# Patient Record
Sex: FEMALE | Race: WHITE | URBAN - METROPOLITAN AREA
[De-identification: names, ages, dates, MRNs, and addresses within clinical notes are randomized per-mention and may not be internally consistent; named-entity substitution may affect disease eponyms.]

---

## 2018-06-04 ENCOUNTER — IMPORTED ENCOUNTER (OUTPATIENT)
Dept: URBAN - METROPOLITAN AREA CLINIC 50 | Facility: CLINIC | Age: 78
End: 2018-06-04

## 2018-06-25 ENCOUNTER — IMPORTED ENCOUNTER (OUTPATIENT)
Dept: URBAN - METROPOLITAN AREA CLINIC 50 | Facility: CLINIC | Age: 78
End: 2018-06-25

## 2018-12-31 ENCOUNTER — IMPORTED ENCOUNTER (OUTPATIENT)
Dept: URBAN - METROPOLITAN AREA CLINIC 50 | Facility: CLINIC | Age: 78
End: 2018-12-31

## 2019-05-06 ENCOUNTER — IMPORTED ENCOUNTER (OUTPATIENT)
Dept: URBAN - METROPOLITAN AREA CLINIC 50 | Facility: CLINIC | Age: 79
End: 2019-05-06

## 2019-05-06 NOTE — PATIENT DISCUSSION
"""Start Acyclovir 800 mg by mouth twice a day ."" ""Start Prednisolone Acetate right eye four times a day . """

## 2019-05-20 ENCOUNTER — IMPORTED ENCOUNTER (OUTPATIENT)
Dept: URBAN - METROPOLITAN AREA CLINIC 50 | Facility: CLINIC | Age: 79
End: 2019-05-20

## 2019-06-17 ENCOUNTER — IMPORTED ENCOUNTER (OUTPATIENT)
Dept: URBAN - METROPOLITAN AREA CLINIC 50 | Facility: CLINIC | Age: 79
End: 2019-06-17

## 2019-06-24 ENCOUNTER — IMPORTED ENCOUNTER (OUTPATIENT)
Dept: URBAN - METROPOLITAN AREA CLINIC 50 | Facility: CLINIC | Age: 79
End: 2019-06-24

## 2019-12-04 ENCOUNTER — IMPORTED ENCOUNTER (OUTPATIENT)
Dept: URBAN - METROPOLITAN AREA CLINIC 50 | Facility: CLINIC | Age: 79
End: 2019-12-04

## 2019-12-09 ENCOUNTER — IMPORTED ENCOUNTER (OUTPATIENT)
Dept: URBAN - METROPOLITAN AREA CLINIC 50 | Facility: CLINIC | Age: 79
End: 2019-12-09

## 2020-05-12 ENCOUNTER — IMPORTED ENCOUNTER (OUTPATIENT)
Dept: URBAN - METROPOLITAN AREA CLINIC 50 | Facility: CLINIC | Age: 80
End: 2020-05-12

## 2020-06-29 ENCOUNTER — IMPORTED ENCOUNTER (OUTPATIENT)
Dept: URBAN - METROPOLITAN AREA CLINIC 50 | Facility: CLINIC | Age: 80
End: 2020-06-29

## 2020-12-21 ENCOUNTER — IMPORTED ENCOUNTER (OUTPATIENT)
Dept: URBAN - METROPOLITAN AREA CLINIC 50 | Facility: CLINIC | Age: 80
End: 2020-12-21

## 2020-12-21 NOTE — PATIENT DISCUSSION
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

## 2021-03-12 ENCOUNTER — IMPORTED ENCOUNTER (OUTPATIENT)
Dept: URBAN - METROPOLITAN AREA CLINIC 50 | Facility: CLINIC | Age: 81
End: 2021-03-12

## 2021-04-17 ASSESSMENT — VISUAL ACUITY
OS_CC: 20/25
OD_CC: 20/30
OS_CC: 20/30-1
OS_CC: 20/30
OD_CC: 20/25-2
OS_CC: 20/40-2
OS_CC: 20/25-1
OS_CC: J1+
OS_CC: J1+
OS_CC: 20/25
OS_CC: J2@ 13 IN
OD_CC: J1+
OS_CC: J2@ 15 IN
OD_CC: J1+
OS_OTHER: 20/70. 20/200.
OS_CC: 20/25-2
OD_CC: 20/25+1
OS_CC: 20/30
OS_CC: 20/30-1
OS_CC: 20/30
OD_CC: 20/20
OD_CC: 20/30-1
OS_CC: 20/30+2
OD_CC: 20/20
OD_CC: 20/20
OS_BAT: 20/70
OD_CC: J1+
OD_CC: J1+@ 15 IN
OD_CC: 20/25-2
OS_CC: J1+@ 15 IN
OD_OTHER: 20/30. 20/70.
OD_CC: J2@ 15 IN
OS_CC: J1+
OD_CC: 20/30
OD_CC: J1+
OD_CC: 20/20
OD_BAT: 20/30
OS_PH: 20/40
OS_CC: J1+
OD_CC: 20/20-2
OD_CC: J2@ 13 IN

## 2021-04-17 ASSESSMENT — TONOMETRY
OS_IOP_MMHG: 14
OD_IOP_MMHG: 14
OD_IOP_MMHG: 13
OD_IOP_MMHG: 14
OD_IOP_MMHG: 14
OS_IOP_MMHG: 15
OD_IOP_MMHG: 16
OS_IOP_MMHG: 16
OS_IOP_MMHG: 15
OD_IOP_MMHG: 12
OS_IOP_MMHG: 13
OS_IOP_MMHG: 12
OD_IOP_MMHG: 14
OD_IOP_MMHG: 12
OD_IOP_MMHG: 12
OS_IOP_MMHG: 14
OD_IOP_MMHG: 15

## 2021-06-22 ENCOUNTER — PREPPED CHART (OUTPATIENT)
Dept: URBAN - METROPOLITAN AREA CLINIC 52 | Facility: CLINIC | Age: 81
End: 2021-06-22

## 2021-06-28 ENCOUNTER — COMPREHENSIVE EXAM (OUTPATIENT)
Dept: URBAN - METROPOLITAN AREA CLINIC 52 | Facility: CLINIC | Age: 81
End: 2021-06-28

## 2021-06-28 DIAGNOSIS — H31.092: ICD-10-CM

## 2021-06-28 DIAGNOSIS — H43.813: ICD-10-CM

## 2021-06-28 DIAGNOSIS — H35.3131: ICD-10-CM

## 2021-06-28 DIAGNOSIS — H53.2: ICD-10-CM

## 2021-06-28 PROCEDURE — 92014 COMPRE OPH EXAM EST PT 1/>: CPT

## 2021-06-28 PROCEDURE — 92015 DETERMINE REFRACTIVE STATE: CPT

## 2021-06-28 PROCEDURE — 92134 CPTRZ OPH DX IMG PST SGM RTA: CPT

## 2021-06-28 ASSESSMENT — TONOMETRY
OS_IOP_MMHG: 16
OD_IOP_MMHG: 15

## 2021-06-28 ASSESSMENT — VISUAL ACUITY
OD_PH: 20/50
OD_CC: 20/40
OS_PH: 20/80
OD_SC: 20/70
OU_SC: 20/70
OU_SC: J1+ @ 16IN
OS_SC: 20/400
OS_CC: 20/60
OU_CC: 20/30

## 2021-07-26 ENCOUNTER — MOTILITY CHECK (OUTPATIENT)
Dept: URBAN - METROPOLITAN AREA CLINIC 53 | Facility: CLINIC | Age: 81
End: 2021-07-26

## 2021-07-26 DIAGNOSIS — H35.3221: ICD-10-CM

## 2021-07-26 DIAGNOSIS — H53.2: ICD-10-CM

## 2021-07-26 PROCEDURE — 92015 DETERMINE REFRACTIVE STATE: CPT

## 2021-07-26 PROCEDURE — 92060 SENSORIMOTOR EXAMINATION: CPT

## 2021-07-26 RX ORDER — LIFITEGRAST 50 MG/ML
1 SOLUTION/ DROPS OPHTHALMIC TWICE A DAY
Start: 2021-07-26

## 2021-07-26 ASSESSMENT — VISUAL ACUITY: OU_SC: J1+

## 2021-07-26 NOTE — PATIENT DISCUSSION
Patient unable to fuse with any prism amount today. Was also experiencing monocular diplopia OS which changed with blinking. Advised patient on HEIDI routine and to rtc in 3-4 weeks to re-do motility.

## 2021-09-17 ENCOUNTER — MOTILITY CHECK (OUTPATIENT)
Dept: URBAN - METROPOLITAN AREA CLINIC 53 | Facility: CLINIC | Age: 81
End: 2021-09-17

## 2021-09-17 DIAGNOSIS — H35.3221: ICD-10-CM

## 2021-09-17 DIAGNOSIS — H53.2: ICD-10-CM

## 2021-09-17 PROCEDURE — 92015NC REFRACTION NO CHARGE

## 2021-09-17 ASSESSMENT — VISUAL ACUITY
OS_CC: 20/40
OD_CC: 20/20
OU_CC: 20/25+2
OS_PH: 20/30

## 2022-06-21 ENCOUNTER — EMERGENCY VISIT (OUTPATIENT)
Dept: URBAN - METROPOLITAN AREA CLINIC 53 | Facility: CLINIC | Age: 82
End: 2022-06-21

## 2022-06-21 DIAGNOSIS — H35.3221: ICD-10-CM

## 2022-06-21 DIAGNOSIS — H35.62: ICD-10-CM

## 2022-06-21 PROCEDURE — 92014 COMPRE OPH EXAM EST PT 1/>: CPT

## 2022-06-21 PROCEDURE — 92134 CPTRZ OPH DX IMG PST SGM RTA: CPT

## 2022-06-21 ASSESSMENT — TONOMETRY
OD_IOP_MMHG: 14
OS_IOP_MMHG: 14

## 2022-06-21 ASSESSMENT — VISUAL ACUITY
OD_CC: 20/25
OS_CC: 20/60

## 2022-06-21 NOTE — PATIENT DISCUSSION
Recommend referral to [facility] for further evaluation and possible treatment within the next week.

## 2022-06-21 NOTE — PATIENT DISCUSSION
Recommended PF artificial tears, Warm Compresses, Lid Scrubs and Xiidra to use as directed. Offered, attempted but was not successful

## 2022-06-21 NOTE — PATIENT DISCUSSION
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 Recommend referral to [facility] for patient to be seen within 1 week. Patient was last seen by [facility] 03/2021. Will send referral to [facility].

## 2023-01-10 ENCOUNTER — COMPREHENSIVE EXAM (OUTPATIENT)
Dept: URBAN - METROPOLITAN AREA CLINIC 53 | Facility: CLINIC | Age: 83
End: 2023-01-10

## 2023-01-10 DIAGNOSIS — H43.813: ICD-10-CM

## 2023-01-10 DIAGNOSIS — H35.62: ICD-10-CM

## 2023-01-10 DIAGNOSIS — H35.3221: ICD-10-CM

## 2023-01-10 DIAGNOSIS — H35.3111: ICD-10-CM

## 2023-01-10 PROCEDURE — 92134 CPTRZ OPH DX IMG PST SGM RTA: CPT

## 2023-01-10 PROCEDURE — 92014 COMPRE OPH EXAM EST PT 1/>: CPT

## 2023-01-10 ASSESSMENT — TONOMETRY
OD_IOP_MMHG: 14
OS_IOP_MMHG: 14

## 2023-01-10 ASSESSMENT — VISUAL ACUITY
OS_CC: 20/80
OS_PH: 20/80
OD_CC: 20/20-2

## 2025-06-24 ENCOUNTER — COMPREHENSIVE EXAM (OUTPATIENT)
Age: 85
End: 2025-06-24

## 2025-06-24 DIAGNOSIS — H53.2: ICD-10-CM

## 2025-06-24 DIAGNOSIS — H16.223: ICD-10-CM

## 2025-06-24 DIAGNOSIS — H35.3111: ICD-10-CM

## 2025-06-24 DIAGNOSIS — H26.491: ICD-10-CM

## 2025-06-24 DIAGNOSIS — H31.092: ICD-10-CM

## 2025-06-24 DIAGNOSIS — H43.813: ICD-10-CM

## 2025-06-24 DIAGNOSIS — H35.3221: ICD-10-CM

## 2025-06-24 PROCEDURE — 99214 OFFICE O/P EST MOD 30 MIN: CPT

## 2025-06-24 PROCEDURE — 92134 CPTRZ OPH DX IMG PST SGM RTA: CPT

## 2025-07-01 ENCOUNTER — CONTACT LENSES/GLASSES VISIT (OUTPATIENT)
Age: 85
End: 2025-07-01

## 2025-07-01 DIAGNOSIS — H53.2: ICD-10-CM

## 2025-07-01 PROCEDURE — 92060 SENSORIMOTOR EXAMINATION: CPT
